# Patient Record
Sex: FEMALE | Race: WHITE
[De-identification: names, ages, dates, MRNs, and addresses within clinical notes are randomized per-mention and may not be internally consistent; named-entity substitution may affect disease eponyms.]

---

## 2018-04-21 NOTE — ULTRASOUND REPORT
EXAM:

INGUINAL ULTRASOUND

 

EXAM DATE: 4/21/2018 03:15 PM.

 

CLINICAL HISTORY: Left inguinal bulge, especially in the upright position.

 

COMPARISON: None.

 

TECHNIQUE: Real-time sonographic imaging of the inguinal canals and vascular structures, including co
aubrey-flow, was performed by the sonographer. Multiple representative static images were saved for revi
ew.

 

FINDINGS: 

Hernia: Best seen in supine position with Valsalva is a small fat-containing left inguinal hernia wit
h a neck of 6 mm. No sign of herniated bowel. No other ultrasound abnormality. 

 

Soft Tissues: Normal. No fluid collections or adenopathy. 

 

Other: None.

 

IMPRESSION: Small fat-containing left inguinal hernia.

 

[facility]

Referring Provider Line: [phone]

 

SITE ID: [id number]

## 2018-10-02 NOTE — MAMMOGRAPHY REPORT
Reason:  SCREENING w ABIODUN

Procedure Date:  10/01/2018   

Accession Number:  [id number] / [id number]                    

Procedure:  MCKAYLA - Screening Mammo w/Abiodun CPT Code:  

 

FULL RESULT:

 

 

EXAM: Screening Mammo w/Abiodun

 

DATE: 10/1/2018 11:50 AM

 

CLINICAL HISTORY: 63-year-old female with history of biopsy and family 

history of breast cancer in the mother at age 80, an aunt at age 64 and a 

cousin at age 30.

 

TECHNIQUE: Bilateral CC and MLO views were obtained.

 

COMPARISON: 11/17/2016, 3/9/2015, 4/16/2012, 2/18/2009.

 

FINDINGS:

The breasts demonstrate heterogeneously dense fibroglandular parenchyma 

bilaterally. In the left lateral mid breast approximately 6 cm deep is a 

well-circumscribed nodule which is obscured and 2-D views but 

well-demonstrated tomographically. This requires additional imaging and 

diagnostic ultrasound. No suspicious masses, clustered 

microcalcifications, or regions of architectural distortion are 

identified.

 

IMPRESSION: Incomplete examination

 

RECOMMENDATION: Additional evaluation as above.

 

BIRADS CATEGORY 0: Incomplete examination

 

STANDARD QUALIFYING STATEMENTS:

1.  This examination was not reviewed with the aid of Computer-Aided 

Detection (CAD).

2.  A negative or benign  imaging report should not delay biopsy if 

clinically suspicious findings are present.  Consider surgical 

consultation if warrented.  More than 5% of cancers are not identified by 

imaging.

3.  Dense breasts may obscure an underlying neoplasm.

4. This examination was reviewed with the aid of 3D breast imaging 

(tomosynthesis).

## 2018-10-16 NOTE — ULTRASOUND REPORT
Reason:  ABN MAMMO

Procedure Date:  10/16/2018   

Accession Number:  [id number] / [id number]                    

Procedure:  US  - Breast Unilateral Limited CPT Code:  

 

FULL RESULT:

 

 

EXAM: Diag Special Views Dig LT, Breast Unilateral Limited

 

DATE: 10/16/2018 3:38 PM

 

CLINICAL HISTORY: Follow-up abnormal mammogram left breast.

 

LEFT BREAST ADDITIONAL VIEWS:

 

TECHNIQUE: Spot compression and additional true lateral view left breast

 

COMPARISON: Mammogram 11/1/2018, left breast ultrasound 11/17/2016

 

FINDINGS:

The density previously described in the left upper outer quadrant 2:00 

position approximately 6 to 7 cm from the nipple does not definitely 

persist on additional views. A small subcentimeter superficial ovoid 

density is seen.

 

LEFT BREAST ULTRASOUND

 

TECHNIQUE: Real-time scanning by the sonographer with saved static images 

reviewed.

 

FINDINGS: Left breast 2:00 position 5 cm from the nipple there is a 5 x 3 

x 5 mm well-circumscribed hypoechoic, ovoid lesion which may represent a 

small cyst or lymph node. This is stable compared to the 11/17/2016 

ultrasound. No other cystic or solid mass is identified left upper outer 

quadrant.

 

IMPRESSION: Benign findings

 

RECOMMENDATION: Follow-up unilateral left breast mammogram in 6 months.

 

BIRADS CATEGORY 2: Benign findings

 

STANDARD QUALIFYING STATEMENTS:

1.  This examination was reviewed with the aid of Computer-Aided 

Detection (CAD).

2.  A negative or benign  imaging report should not delay biopsy if 

clinically suspicious findings are present.  Consider surgical 

consultation if warrented.  More than 5% of cancers are not identified by 

imaging.

3.  Dense breasts may obscure an underlying neoplasm.

## 2018-10-16 NOTE — MAMMOGRAPHY REPORT
Reason:  ABN MAMM0 - LT SP VIEWS

Procedure Date:  10/16/2018   

Accession Number:  [id number] / [id number]                    

Procedure:  MCKAYLA - Diag Special Views Dig LT CPT Code:  

 

FULL RESULT:

 

 

EXAM: Diag Special Views Dig LT, Breast Unilateral Limited

 

DATE: 10/16/2018 3:38 PM

 

CLINICAL HISTORY: Follow-up abnormal mammogram left breast.

 

LEFT BREAST ADDITIONAL VIEWS:

 

TECHNIQUE: Spot compression and additional true lateral view left breast

 

COMPARISON: Mammogram 11/1/2018, left breast ultrasound 11/17/2016

 

FINDINGS:

The density previously described in the left upper outer quadrant 2:00 

position approximately 6 to 7 cm from the nipple does not definitely 

persist on additional views. A small subcentimeter superficial ovoid 

density is seen.

 

LEFT BREAST ULTRASOUND

 

TECHNIQUE: Real-time scanning by the sonographer with saved static images 

reviewed.

 

FINDINGS: Left breast 2:00 position 5 cm from the nipple there is a 5 x 3 

x 5 mm well-circumscribed hypoechoic, ovoid lesion which may represent a 

small cyst or lymph node. This is stable compared to the 11/17/2016 

ultrasound. No other cystic or solid mass is identified left upper outer 

quadrant.

 

IMPRESSION: Benign findings

 

RECOMMENDATION: Follow-up unilateral left breast mammogram in 6 months.

 

BIRADS CATEGORY 2: Benign findings

 

STANDARD QUALIFYING STATEMENTS:

1.  This examination was reviewed with the aid of Computer-Aided 

Detection (CAD).

2.  A negative or benign  imaging report should not delay biopsy if 

clinically suspicious findings are present.  Consider surgical 

consultation if warrented.  More than 5% of cancers are not identified by 

imaging.

3.  Dense breasts may obscure an underlying neoplasm.

## 2020-07-07 ENCOUNTER — HOSPITAL ENCOUNTER (OUTPATIENT)
Dept: HOSPITAL 76 - LAB | Age: 65
Discharge: HOME | End: 2020-07-07
Attending: INTERNAL MEDICINE
Payer: COMMERCIAL

## 2020-07-07 DIAGNOSIS — R79.89: Primary | ICD-10-CM

## 2020-07-07 DIAGNOSIS — R89.9: ICD-10-CM

## 2020-07-07 LAB
IRON SATN MFR SERPL: 37 % (ref 20–50)
IRON SERPL-MCNC: 118 UG/DL (ref 28–170)
TIBC SERPL-MCNC: 319 UG/DL (ref 250–450)
TRANSFERRIN SERPL-MCNC: 228 MG/DL (ref 192–382)

## 2020-07-07 PROCEDURE — 83540 ASSAY OF IRON: CPT

## 2020-07-07 PROCEDURE — 36415 COLL VENOUS BLD VENIPUNCTURE: CPT

## 2020-07-07 PROCEDURE — 84466 ASSAY OF TRANSFERRIN: CPT
